# Patient Record
Sex: FEMALE | Race: WHITE
[De-identification: names, ages, dates, MRNs, and addresses within clinical notes are randomized per-mention and may not be internally consistent; named-entity substitution may affect disease eponyms.]

---

## 2019-07-01 ENCOUNTER — HOSPITAL ENCOUNTER (OUTPATIENT)
Dept: HOSPITAL 92 - BICRAD | Age: 44
Discharge: HOME | End: 2019-07-01
Payer: COMMERCIAL

## 2019-07-01 DIAGNOSIS — Z02.71: Primary | ICD-10-CM

## 2019-07-01 NOTE — RAD
Exam:Left knee 2 view



HISTORY: Disability evaluation



COMPARISON: None



FINDINGS: No joint effusion. No fracture. No malalignment. Joint spaces are preserved.



IMPRESSION: Unremarkable 2 views left knee



Reported By: Andre Estevez 

Electronically Signed:  7/1/2019 3:57 PM